# Patient Record
Sex: MALE | Race: WHITE | NOT HISPANIC OR LATINO | Employment: OTHER | URBAN - METROPOLITAN AREA
[De-identification: names, ages, dates, MRNs, and addresses within clinical notes are randomized per-mention and may not be internally consistent; named-entity substitution may affect disease eponyms.]

---

## 2017-11-08 ENCOUNTER — DOCUMENTATION ONLY (OUTPATIENT)
Dept: CARDIOLOGY | Facility: CLINIC | Age: 67
End: 2017-11-08

## 2017-11-08 NOTE — PROGRESS NOTES
Referred by Joel in [place] for evaluation of TIA and PFO.  BUbble study positive on Osirix only.  Will see us Monday for evaluation and possible PFO closure.

## 2017-11-09 ENCOUNTER — DOCUMENTATION ONLY (OUTPATIENT)
Dept: CARDIOLOGY | Facility: CLINIC | Age: 67
End: 2017-11-09

## 2017-11-09 RX ORDER — WARFARIN SODIUM 5 MG/1
5 TABLET ORAL DAILY
Status: ON HOLD | COMMUNITY
End: 2018-01-11 | Stop reason: HOSPADM

## 2017-11-09 RX ORDER — ASPIRIN 81 MG/1
81 TABLET ORAL DAILY
COMMUNITY

## 2017-11-13 ENCOUNTER — OFFICE VISIT (OUTPATIENT)
Dept: CARDIOLOGY | Facility: CLINIC | Age: 67
End: 2017-11-13
Payer: MEDICARE

## 2017-11-13 ENCOUNTER — CLINICAL SUPPORT (OUTPATIENT)
Dept: ELECTROPHYSIOLOGY | Facility: CLINIC | Age: 67
End: 2017-11-13
Attending: INTERNAL MEDICINE
Payer: MEDICARE

## 2017-11-13 VITALS
HEART RATE: 62 BPM | WEIGHT: 199.5 LBS | DIASTOLIC BLOOD PRESSURE: 78 MMHG | HEIGHT: 73 IN | SYSTOLIC BLOOD PRESSURE: 143 MMHG | BODY MASS INDEX: 26.44 KG/M2 | OXYGEN SATURATION: 100 %

## 2017-11-13 DIAGNOSIS — Q21.12 PFO (PATENT FORAMEN OVALE): ICD-10-CM

## 2017-11-13 DIAGNOSIS — I67.848 OTHER CEREBROVASCULAR VASOSPASM AND VASOCONSTRICTION: ICD-10-CM

## 2017-11-13 DIAGNOSIS — G45.9 TRANSIENT CEREBRAL ISCHEMIA, UNSPECIFIED TYPE: ICD-10-CM

## 2017-11-13 DIAGNOSIS — Q21.12 PFO (PATENT FORAMEN OVALE): Primary | ICD-10-CM

## 2017-11-13 PROCEDURE — 99214 OFFICE O/P EST MOD 30 MIN: CPT | Mod: PBBFAC | Performed by: INTERNAL MEDICINE

## 2017-11-13 PROCEDURE — 93270 REMOTE 30 DAY ECG REV/REPORT: CPT | Mod: PBBFAC | Performed by: INTERNAL MEDICINE

## 2017-11-13 PROCEDURE — 99205 OFFICE O/P NEW HI 60 MIN: CPT | Mod: S$PBB,,, | Performed by: INTERNAL MEDICINE

## 2017-11-13 PROCEDURE — 99999 PR PBB SHADOW E&M-EST. PATIENT-LVL IV: CPT | Mod: PBBFAC,,, | Performed by: INTERNAL MEDICINE

## 2017-11-13 NOTE — PROGRESS NOTES
"Subjective:    Patient ID:  Shaheen Pascual is a 67 y.o. male who presents for evaluation of PFO.  Referring physician: Dr Maldonado  Reason for referral: TIA and PFO    HPI   Patient is a 67 year old male with PMH sx for:   TIA   PFO  Who is referred by Dr Maldonado in [place] for PFO closure. As per patient he has had 4 episodes of TIA's with resolution of symptoms every time.  Symptoms include hemiparesis, now resolved.  He has hx of multiple long road trips.  Lower ext doppler US negative for DVT, carotids reviewed, had 5 day holter monitor with no evidence of a fib and is now here for PFO closure.  Denies palpitations or chest pain.    Review of Systems   Constitution: Negative for chills, fever, weakness and malaise/fatigue.   HENT: Negative for hearing loss.    Eyes: Negative for pain, vision loss in left eye, vision loss in right eye and visual disturbance.   Cardiovascular: Negative for chest pain, claudication, cyanosis, dyspnea on exertion, irregular heartbeat, leg swelling, near-syncope, orthopnea, palpitations, paroxysmal nocturnal dyspnea and syncope.   Respiratory: Negative for cough, hemoptysis, shortness of breath, sleep disturbances due to breathing and snoring.    Endocrine: Negative for cold intolerance and heat intolerance.   Hematologic/Lymphatic: Negative for adenopathy and bleeding problem.   Skin: Negative for poor wound healing.   Musculoskeletal: Negative for arthritis, back pain, falls, gout, joint pain, joint swelling and muscle cramps.   Gastrointestinal: Negative for bloating, abdominal pain and anorexia.   Neurological: Negative for brief paralysis, focal weakness, headaches, light-headedness, loss of balance, numbness and sensory change.   Psychiatric/Behavioral: Negative for altered mental status.        Objective:  BP (!) 143/78 (BP Location: Right arm, Patient Position: Sitting, BP Method: Large (Automatic))   Pulse 62   Ht 6' 1" (1.854 m)   Wt 90.5 kg (199 lb 8.3 oz)   SpO2 " 100%   BMI 26.32 kg/m²       Physical Exam   Constitutional: He is oriented to person, place, and time. He appears well-developed and well-nourished.   HENT:   Head: Normocephalic and atraumatic.   Eyes: Conjunctivae and EOM are normal. Pupils are equal, round, and reactive to light.   Neck: Normal range of motion. Neck supple. No JVD present.   Cardiovascular: Normal rate, regular rhythm, normal heart sounds and intact distal pulses.  Exam reveals no gallop and no friction rub.    No murmur heard.  Pulmonary/Chest: Effort normal. No respiratory distress. He has no wheezes. He has no rales.   Abdominal: Soft. Bowel sounds are normal. He exhibits no distension. There is no tenderness.   Musculoskeletal: Normal range of motion. He exhibits no edema or deformity.   Neurological: He is alert and oriented to person, place, and time.   Skin: Skin is warm and dry.   Psychiatric: He has a normal mood and affect. His behavior is normal.         Assessment:       1. PFO (patent foramen ovale) - pt with multiple TIA's, would like to get 30 day event monitor, if negative will plan on continuing with PFO closure.  Patient understands risks and benefits and gives consent.  Cont asp/coumadin.  If needs procedure will hold coumadin 5 days prior.   2. Transient cerebral ischemia, unspecified type         Plan:       30 day Event monitor autotrigger  Asp/coumadin    Joan Shin MD  Interventional Cardiology    Staff:  I have personally taken the history and examined this patient and agree with the fellow's note as stated above and amended it accordingly :-) Will do a 30 day event monitor and if no afib will stop warfarin, start plavix, and do PFO closure.  I want to be as certain as possible that afib isn't responsible for his TIA's at age 67.

## 2017-11-13 NOTE — LETTER
November 13, 2017      Ricki Maldonado MD  [address] [facility] [place] [address]           [facility]  [address]  [place] [address]  Phone: [phone]          Patient: Shaheen Pascual   MR Number: [id number]   YOB: 1950   Date of Visit: 11/13/2017       Dear Dr. Ricki Maldonado:    Thank you for referring Shaheen Pascual to me for evaluation. Attached you will find relevant portions of my assessment and plan of care.    If you have questions, please do not hesitate to call me. I look forward to following Shaheen Pascual along with you.    Sincerely,    Jorge Pate MD    Enclosure  CC:  No Recipients    If you would like to receive this communication electronically, please contact [email or web address] or [phone] to request more information on [employer] access.    For providers and/or their staff who would like to refer a patient to [facility], please contact us through our one-stop-shop provider referral line, [facility], at [phone].    If you feel you have received this communication in error or would no longer like to receive these types of communications, please e-mail [email or web address]

## 2017-12-20 PROCEDURE — 93272 ECG/REVIEW INTERPRET ONLY: CPT | Mod: ,,, | Performed by: INTERNAL MEDICINE

## 2018-01-02 ENCOUNTER — DOCUMENTATION ONLY (OUTPATIENT)
Dept: CARDIOLOGY | Facility: CLINIC | Age: 68
End: 2018-01-02

## 2018-01-02 DIAGNOSIS — Q21.12 PFO (PATENT FORAMEN OVALE): Primary | ICD-10-CM

## 2018-01-02 DIAGNOSIS — N18.9 CHRONIC KIDNEY DISEASE, UNSPECIFIED CKD STAGE: ICD-10-CM

## 2018-01-02 RX ORDER — DEXTROSE MONOHYDRATE AND SODIUM CHLORIDE 5; .45 G/100ML; G/100ML
INJECTION, SOLUTION INTRAVENOUS CONTINUOUS
Status: CANCELLED | OUTPATIENT
Start: 2018-01-02

## 2018-01-02 RX ORDER — CLOPIDOGREL BISULFATE 75 MG/1
75 TABLET ORAL DAILY
Qty: 30 TABLET | Refills: 11 | Status: SHIPPED | OUTPATIENT
Start: 2018-01-02 | End: 2022-11-28

## 2018-01-02 NOTE — PROGRESS NOTES
OUTPATIENT CATHETERIZATION INSTRUCTIONS    You have been scheduled for a procedure in the catheterization lab on Wednesday, January 10,  2018.     Please report to the Cardiology Waiting Area on the Third floor of the hospital and check in at 8 AM.   You will then be taken to the SSCU (Short Stay Cardiac Unit) and prepared for your procedure. Please be aware that this is not the time of your procedure but the time you are to arrive. The procedures are scheduled on an hourly basis; however, emergency cases take precedence over all other cases.       You may not have anything to eat or drink after midnight the night before your test. You may take your regular morning medications with water. If there are any medications that you should not take you will be instructed to hold them that morning. If you are diabetic and on Metformin (Glucophage) do not take it the day before, the day of, and for 2 days after your procedure.      The procedure will take 1-2 hours to perform. After the procedure, you will return to SSCU on the third floor of the hospital. You will need to lie still (or keep your arm still) for the next 4 to 6 hours to minimize bleeding from the puncture site. Your family may remain in the room with you during this time.       You may be able to be discharged home that same afternoon if there is someone to drive you home and there were no complications. If you have one of the balloon, stent, or device procedures you may spend the night in the hospital. Your doctor will determine, based on your progress, the date and time of your discharge. The results of your procedure will be discussed with you before you are discharged. Any further testing or procedures will be scheduled for you either before you leave or you will be called with these appointments.       If you should have any questions, concerns, or need to change the date of your procedure, please call [facility] @ [phone]    Special  Instructions:  Start taking Plavix 75 mg daily. Continue Aspirin 81 mg daily. Stop taking Coumadin.      THE ABOVE INSTRUCTIONS WERE GIVEN TO THE PATIENT VERBALLY AND THEY VERBALIZED UNDERSTANDING.  THEY DO NOT REQUIRE ANY SPECIAL NEEDS AND DO NOT HAVE ANY LEARNING BARRIERS.      Directions for Reporting to Cardiology Waiting Area in the Hospital  If you park in the Parking Garage:  Take elevators to the1st floor of the parking garage.  Continue past the gift shop, coffee shop, and piano.  Take a right and go to the gold elevators. (Elevator B)  Take the elevator to the 3rd floor.  Follow the arrow on the sign on the wall that says Cath Lab Registration/EP Lab Registration.  Follow the long hallway all the way around until you come to a big open area.  This is the registration area.  Check in at Reception Desk.    OR    If family is dropping you off:  Have them drop you off at the front of the Hospital under the green overhang.  Enter through the doors and take a right.  Take the E elevators to the 3rd floor Cardiology Waiting Area.  Check in at the Reception Desk in the waiting room.

## 2018-01-10 ENCOUNTER — HOSPITAL ENCOUNTER (OUTPATIENT)
Facility: HOSPITAL | Age: 68
Discharge: HOME | End: 2018-01-11
Attending: INTERNAL MEDICINE | Admitting: INTERNAL MEDICINE
Payer: MEDICARE

## 2018-01-10 DIAGNOSIS — N18.9 CHRONIC KIDNEY DISEASE, UNSPECIFIED CKD STAGE: ICD-10-CM

## 2018-01-10 DIAGNOSIS — Q21.12 PFO (PATENT FORAMEN OVALE): ICD-10-CM

## 2018-01-10 LAB
ABO + RH BLD: NORMAL
ANION GAP SERPL CALC-SCNC: 8 MMOL/L
APTT BLDCRRT: 22.7 SEC
BLD GP AB SCN CELLS X3 SERPL QL: NORMAL
BUN SERPL-MCNC: 17 MG/DL
CALCIUM SERPL-MCNC: 9.3 MG/DL
CHLORIDE SERPL-SCNC: 106 MMOL/L
CO2 SERPL-SCNC: 28 MMOL/L
CREAT SERPL-MCNC: 0.8 MG/DL
ERYTHROCYTE [DISTWIDTH] IN BLOOD BY AUTOMATED COUNT: 12.9 %
EST. GFR  (AFRICAN AMERICAN): >60 ML/MIN/1.73 M^2
EST. GFR  (NON AFRICAN AMERICAN): >60 ML/MIN/1.73 M^2
GLUCOSE SERPL-MCNC: 90 MG/DL
HCT VFR BLD AUTO: 43.8 %
HGB BLD-MCNC: 14.6 G/DL
INR PPP: 0.9
MCH RBC QN AUTO: 31.1 PG
MCHC RBC AUTO-ENTMCNC: 33.3 G/DL
MCV RBC AUTO: 93 FL
PLATELET # BLD AUTO: 203 K/UL
PMV BLD AUTO: 9.8 FL
POTASSIUM SERPL-SCNC: 3.6 MMOL/L
PROTHROMBIN TIME: 10.2 SEC
RBC # BLD AUTO: 4.7 M/UL
SODIUM SERPL-SCNC: 142 MMOL/L
WBC # BLD AUTO: 4.82 K/UL

## 2018-01-10 PROCEDURE — 63600175 PHARM REV CODE 636 W HCPCS

## 2018-01-10 PROCEDURE — 93010 ELECTROCARDIOGRAM REPORT: CPT | Mod: ,,, | Performed by: INTERNAL MEDICINE

## 2018-01-10 PROCEDURE — 63600175 PHARM REV CODE 636 W HCPCS: Performed by: STUDENT IN AN ORGANIZED HEALTH CARE EDUCATION/TRAINING PROGRAM

## 2018-01-10 PROCEDURE — 27200029 CATH LAB PROCEDURE

## 2018-01-10 PROCEDURE — 80048 BASIC METABOLIC PNL TOTAL CA: CPT

## 2018-01-10 PROCEDURE — S5010 5% DEXTROSE AND 0.45% SALINE: HCPCS | Performed by: INTERNAL MEDICINE

## 2018-01-10 PROCEDURE — 85027 COMPLETE CBC AUTOMATED: CPT

## 2018-01-10 PROCEDURE — 85610 PROTHROMBIN TIME: CPT

## 2018-01-10 PROCEDURE — 99152 MOD SED SAME PHYS/QHP 5/>YRS: CPT

## 2018-01-10 PROCEDURE — 86901 BLOOD TYPING SEROLOGIC RH(D): CPT

## 2018-01-10 PROCEDURE — 93580 TRANSCATH CLOSURE OF ASD: CPT | Mod: ,,, | Performed by: INTERNAL MEDICINE

## 2018-01-10 PROCEDURE — 25000003 PHARM REV CODE 250

## 2018-01-10 PROCEDURE — 85730 THROMBOPLASTIN TIME PARTIAL: CPT

## 2018-01-10 PROCEDURE — 25000003 PHARM REV CODE 250: Performed by: INTERNAL MEDICINE

## 2018-01-10 PROCEDURE — 93005 ELECTROCARDIOGRAM TRACING: CPT | Mod: 59

## 2018-01-10 PROCEDURE — 99152 MOD SED SAME PHYS/QHP 5/>YRS: CPT | Mod: ,,, | Performed by: INTERNAL MEDICINE

## 2018-01-10 RX ORDER — ACETAMINOPHEN 325 MG/1
650 TABLET ORAL EVERY 4 HOURS PRN
Status: DISCONTINUED | OUTPATIENT
Start: 2018-01-10 | End: 2018-01-11 | Stop reason: HOSPADM

## 2018-01-10 RX ORDER — ONDANSETRON 2 MG/ML
4 INJECTION INTRAMUSCULAR; INTRAVENOUS EVERY 12 HOURS PRN
Status: DISCONTINUED | OUTPATIENT
Start: 2018-01-10 | End: 2018-01-11 | Stop reason: HOSPADM

## 2018-01-10 RX ORDER — CLOPIDOGREL BISULFATE 75 MG/1
75 TABLET ORAL DAILY
Status: DISCONTINUED | OUTPATIENT
Start: 2018-01-11 | End: 2018-01-11 | Stop reason: HOSPADM

## 2018-01-10 RX ORDER — ASPIRIN 81 MG/1
81 TABLET ORAL DAILY
Status: DISCONTINUED | OUTPATIENT
Start: 2018-01-11 | End: 2018-01-11 | Stop reason: HOSPADM

## 2018-01-10 RX ORDER — DEXTROSE MONOHYDRATE AND SODIUM CHLORIDE 5; .45 G/100ML; G/100ML
INJECTION, SOLUTION INTRAVENOUS CONTINUOUS
Status: DISCONTINUED | OUTPATIENT
Start: 2018-01-10 | End: 2018-01-11 | Stop reason: HOSPADM

## 2018-01-10 RX ORDER — CEFAZOLIN SODIUM 1 G/50ML
1 SOLUTION INTRAVENOUS
Status: COMPLETED | OUTPATIENT
Start: 2018-01-10 | End: 2018-01-11

## 2018-01-10 RX ADMIN — CEFAZOLIN SODIUM 1 G: 1 SOLUTION INTRAVENOUS at 10:01

## 2018-01-10 RX ADMIN — DEXTROSE AND SODIUM CHLORIDE: 5; .45 INJECTION, SOLUTION INTRAVENOUS at 08:01

## 2018-01-10 NOTE — SUBJECTIVE & OBJECTIVE
Past Medical History:   Diagnosis Date    Anticoagulant long-term use     PFO (patent foramen ovale)     TIA (transient ischemic attack)        Past Surgical History:   Procedure Laterality Date    COLON SURGERY         Review of patient's allergies indicates:  No Known Allergies    PTA Medications   Medication Sig    aspirin (ECOTRIN) 81 MG EC tablet Take 81 mg by mouth once daily.    clopidogrel (PLAVIX) 75 mg tablet Take 1 tablet (75 mg total) by mouth once daily.    VIT C/E/ZN/COPPR/LUTEIN/ZEAXAN (PRESERVISION AREDS 2 ORAL) Take 2 tablets by mouth once daily.    TETRAHYDROZOLINE HCL/ZINC SULF (EYE DROPS OPHT) Apply to eye.    warfarin (COUMADIN) 5 MG tablet Take 5 mg by mouth Daily.     Family History     None        Social History Main Topics    Smoking status: Former Smoker     Start date: 11/13/1977    Smokeless tobacco: Never Used    Alcohol use No    Drug use: No    Sexual activity: Not on file     ROS  Objective:     Vital Signs (Most Recent):  Temp: 97 °F (36.1 °C) (01/10/18 0815)  Pulse: 69 (01/10/18 0815)  Resp: 18 (01/10/18 0815)  BP: 138/77 (01/10/18 0816)  SpO2: 99 % (01/10/18 0815) Vital Signs (24h Range):  Temp:  [97 °F (36.1 °C)] 97 °F (36.1 °C)  Pulse:  [69] 69  Resp:  [18] 18  SpO2:  [99 %] 99 %  BP: (137-138)/(77-81) 138/77     Weight: 95.3 kg (210 lb)  Body mass index is 27.71 kg/m².    SpO2: 99 %  O2 Device (Oxygen Therapy): room air    No intake or output data in the 24 hours ending 01/10/18 0838    Lines/Drains/Airways     Peripheral Intravenous Line                 Peripheral IV - Single Lumen 01/10/18 0828 Left Antecubital less than 1 day         Peripheral IV - Single Lumen 01/10/18 0829 Left Hand less than 1 day                Physical Exam   Constitutional: He is oriented to person, place, and time. He appears well-developed and well-nourished.   HENT:   Head: Normocephalic and atraumatic.   Eyes: EOM are normal. Pupils are equal, round, and reactive to light.   Neck:  Normal range of motion. No JVD present. No tracheal deviation present. No thyromegaly present.   Cardiovascular: Normal rate, regular rhythm, normal heart sounds and intact distal pulses.  Exam reveals no gallop and no friction rub.    No murmur heard.  Abdominal: Soft. Bowel sounds are normal. He exhibits no distension. There is no tenderness. There is no rebound and no guarding.   Musculoskeletal: Normal range of motion.   Neurological: He is oriented to person, place, and time.   Skin: Skin is warm and dry. No rash noted. No erythema.   Nursing note and vitals reviewed.      Significant Labs:   ABG: No results for input(s): PH, PCO2, HCO3, POCSATURATED, BE in the last 48 hours., Blood Culture: No results for input(s): LABBLOO in the last 48 hours., BMP: No results for input(s): GLU, NA, K, CL, CO2, BUN, CREATININE, CALCIUM, MG in the last 48 hours., CMP No results for input(s): NA, K, CL, CO2, GLU, BUN, CREATININE, CALCIUM, PROT, ALBUMIN, BILITOT, ALKPHOS, AST, ALT, ANIONGAP, ESTGFRAFRICA, EGFRNONAA in the last 48 hours., CBC   Recent Labs  Lab 01/10/18  0750   WBC 4.82   HGB 14.6   HCT 43.8      , INR No results for input(s): INR, PROTIME in the last 48 hours. and Lipid Panel No results for input(s): CHOL, HDL, LDLCALC, TRIG, CHOLHDL in the last 48 hours.    Significant Imaging: CT scan: CT ABDOMEN PELVIS WITH CONTRAST: No results found for this visit on 01/10/18. and CT ABDOMEN PELVIS WITHOUT CONTRAST: No results found for this visit on 01/10/18. and Echocardiogram: 2D echo with color flow doppler: No results found for this or any previous visit.

## 2018-01-10 NOTE — HPI
Patient is a 67 year old male with PMH sx for:              TIA              PFO  Who is referred by Dr Maldonado in [place] for PFO closure. As per patient he has had 4 episodes of TIA's with resolution of symptoms every time.  Symptoms include hemiparesis, now resolved.  He has hx of multiple long road trips.  Lower ext doppler US negative for DVT, carotids reviewed, had 5 day holter monitor with no evidence of a fib and is now here for PFO closure.  Denies palpitations or chest pain.

## 2018-01-10 NOTE — H&P
[facility]  Interventional Cardiology  H&P    Patient Name: Shaheen Pascual  MRN: [id number]  Admission Date: 1/10/2018  Code Status: No Order   Attending Provider: Jorge Pate MD   Primary Care Physician: Primary Doctor No  Principal Problem:<principal problem not specified>    Patient information was obtained from patient and ER records.     Subjective:     Chief Complaint:  ASD closure    HPI:  Patient is a 67 year old male with PMH sx for:              TIA              PFO  Who is referred by Dr Maldonado in [place] for PFO closure. As per patient he has had 4 episodes of TIA's with resolution of symptoms every time.  Symptoms include hemiparesis, now resolved.  He has hx of multiple long road trips.  Lower ext doppler US negative for DVT, carotids reviewed, had 5 day holter monitor with no evidence of a fib and is now here for PFO closure.  Denies palpitations or chest pain.       Past Medical History:   Diagnosis Date    Anticoagulant long-term use     PFO (patent foramen ovale)     TIA (transient ischemic attack)        Past Surgical History:   Procedure Laterality Date    COLON SURGERY         Review of patient's allergies indicates:  No Known Allergies    PTA Medications   Medication Sig    aspirin (ECOTRIN) 81 MG EC tablet Take 81 mg by mouth once daily.    clopidogrel (PLAVIX) 75 mg tablet Take 1 tablet (75 mg total) by mouth once daily.    VIT C/E/ZN/COPPR/LUTEIN/ZEAXAN (PRESERVISION AREDS 2 ORAL) Take 2 tablets by mouth once daily.    TETRAHYDROZOLINE HCL/ZINC SULF (EYE DROPS OPHT) Apply to eye.    warfarin (COUMADIN) 5 MG tablet Take 5 mg by mouth Daily.     Family History     None        Social History Main Topics    Smoking status: Former Smoker     Start date: 11/13/1977    Smokeless tobacco: Never Used    Alcohol use No    Drug use: No    Sexual activity: Not on file     ROS  Objective:     Vital Signs (Most Recent):  Temp: 97 °F (36.1 °C) (01/10/18  0815)  Pulse: 69 (01/10/18 0815)  Resp: 18 (01/10/18 0815)  BP: 138/77 (01/10/18 0816)  SpO2: 99 % (01/10/18 0815) Vital Signs (24h Range):  Temp:  [97 °F (36.1 °C)] 97 °F (36.1 °C)  Pulse:  [69] 69  Resp:  [18] 18  SpO2:  [99 %] 99 %  BP: (137-138)/(77-81) 138/77     Weight: 95.3 kg (210 lb)  Body mass index is 27.71 kg/m².    SpO2: 99 %  O2 Device (Oxygen Therapy): room air    No intake or output data in the 24 hours ending 01/10/18 0838    Lines/Drains/Airways     Peripheral Intravenous Line                 Peripheral IV - Single Lumen 01/10/18 0828 Left Antecubital less than 1 day         Peripheral IV - Single Lumen 01/10/18 0829 Left Hand less than 1 day                Physical Exam   Constitutional: He is oriented to person, place, and time. He appears well-developed and well-nourished.   HENT:   Head: Normocephalic and atraumatic.   Eyes: EOM are normal. Pupils are equal, round, and reactive to light.   Neck: Normal range of motion. No JVD present. No tracheal deviation present. No thyromegaly present.   Cardiovascular: Normal rate, regular rhythm, normal heart sounds and intact distal pulses.  Exam reveals no gallop and no friction rub.    No murmur heard.  Abdominal: Soft. Bowel sounds are normal. He exhibits no distension. There is no tenderness. There is no rebound and no guarding.   Musculoskeletal: Normal range of motion.   Neurological: He is oriented to person, place, and time.   Skin: Skin is warm and dry. No rash noted. No erythema.   Nursing note and vitals reviewed.      Significant Labs:   ABG: No results for input(s): PH, PCO2, HCO3, POCSATURATED, BE in the last 48 hours., Blood Culture: No results for input(s): LABBLOO in the last 48 hours., BMP: No results for input(s): GLU, NA, K, CL, CO2, BUN, CREATININE, CALCIUM, MG in the last 48 hours., CMP No results for input(s): NA, K, CL, CO2, GLU, BUN, CREATININE, CALCIUM, PROT, ALBUMIN, BILITOT, ALKPHOS, AST, ALT, ANIONGAP, ESTGFRAFRICA, EGFRNONAA  in the last 48 hours., CBC   Recent Labs  Lab 01/10/18  0750   WBC 4.82   HGB 14.6   HCT 43.8      , INR No results for input(s): INR, PROTIME in the last 48 hours. and Lipid Panel No results for input(s): CHOL, HDL, LDLCALC, TRIG, CHOLHDL in the last 48 hours.    Significant Imaging: CT scan: CT ABDOMEN PELVIS WITH CONTRAST: No results found for this visit on 01/10/18. and CT ABDOMEN PELVIS WITHOUT CONTRAST: No results found for this visit on 01/10/18. and Echocardiogram: 2D echo with color flow doppler: No results found for this or any previous visit.    Assessment and Plan:     PFO (patent foramen ovale)    -Will perform PFO closure using R CFV access  -Patient took this morning ASA and plavix  -The risks of moderate sedation include hypotension, respiratory depression, arrhythmias, bronchospasm, & death.    -Informed consent was obtained & the patient is agreeable to proceed with the procedure. All questions were answered.               VTE Risk Mitigation     None          Luis Carlos Gallardo MD  Interventional Cardiology   [facility]

## 2018-01-10 NOTE — ASSESSMENT & PLAN NOTE
-Will perform RHC via R CFV  -Obtain PV sats and pressures, inhaled nitric oxide test   -Potentially closure of the ASD  -Will do the case in conjunction with Dr Hemphill  -The risks, benefits & alternatives of the procedure were explained to the patient.    -The risks of RHC and ASD include but are not limited to:  Bleeding, infection, heart rhythm abnormalities, allergic reactions, kidney injury, stroke and death.    -The risks of moderate sedation include hypotension, respiratory depression, arrhythmias, bronchospasm, & death.    -Informed consent was obtained & the patient is agreeable to proceed with the procedure. All questions were answered.

## 2018-01-10 NOTE — PROGRESS NOTES
POST CATH NOTE    Procedure:  PFO closure  Referring MD:  Dr Maldonado  Indication:  TIA   Access:  R CFV    Findings:  PFO with Right to left shunt      Intervention:  Successful 25 Newton device septal occluder implantation and successful closure of the PFO    Patient tolerated the procedure well, no complications    Post Cath Exam:  Vitals:    01/10/18 0816   BP: 138/77   Pulse:    Resp:    Temp:      No unusual pain, hematoma or thrill at vascular access site.  Distal pulse present without signs of ischemia.    Post-procedure orders as per [facility]    Recommendation:  -continue dual antiplatelet therapy daily uninterrupted for at least 6 months, ASA lifelong  -Ancef 1 g q hours x 3 doses (still needs two more)  -Discharge tomorrow morning    Luis Carlos Gallardo MD  Cardiology Fellow, PGY VI  Pager: [phone]  1/10/2018 3:40 PM

## 2018-01-11 VITALS
OXYGEN SATURATION: 95 % | BODY MASS INDEX: 27.83 KG/M2 | SYSTOLIC BLOOD PRESSURE: 115 MMHG | RESPIRATION RATE: 20 BRPM | HEIGHT: 73 IN | HEART RATE: 68 BPM | DIASTOLIC BLOOD PRESSURE: 66 MMHG | WEIGHT: 210 LBS | TEMPERATURE: 98 F

## 2018-01-11 LAB
BASOPHILS # BLD AUTO: 0.02 K/UL
BASOPHILS NFR BLD: 0.3 %
DIFFERENTIAL METHOD: ABNORMAL
EOSINOPHIL # BLD AUTO: 0.2 K/UL
EOSINOPHIL NFR BLD: 2.4 %
ERYTHROCYTE [DISTWIDTH] IN BLOOD BY AUTOMATED COUNT: 13.1 %
HCT VFR BLD AUTO: 40.9 %
HGB BLD-MCNC: 13.5 G/DL
IMM GRANULOCYTES # BLD AUTO: 0.01 K/UL
IMM GRANULOCYTES NFR BLD AUTO: 0.2 %
LYMPHOCYTES # BLD AUTO: 1.9 K/UL
LYMPHOCYTES NFR BLD: 29.4 %
MCH RBC QN AUTO: 31 PG
MCHC RBC AUTO-ENTMCNC: 33 G/DL
MCV RBC AUTO: 94 FL
MONOCYTES # BLD AUTO: 0.7 K/UL
MONOCYTES NFR BLD: 11.7 %
NEUTROPHILS # BLD AUTO: 3.5 K/UL
NEUTROPHILS NFR BLD: 56 %
NRBC BLD-RTO: 0 /100 WBC
PLATELET # BLD AUTO: 163 K/UL
PMV BLD AUTO: 10.3 FL
RBC # BLD AUTO: 4.35 M/UL
WBC # BLD AUTO: 6.32 K/UL

## 2018-01-11 PROCEDURE — 85025 COMPLETE CBC W/AUTO DIFF WBC: CPT

## 2018-01-11 PROCEDURE — 63600175 PHARM REV CODE 636 W HCPCS: Performed by: STUDENT IN AN ORGANIZED HEALTH CARE EDUCATION/TRAINING PROGRAM

## 2018-01-11 PROCEDURE — 36415 COLL VENOUS BLD VENIPUNCTURE: CPT

## 2018-01-11 RX ADMIN — CEFAZOLIN SODIUM 1 G: 1 SOLUTION INTRAVENOUS at 07:01

## 2018-01-11 NOTE — SUBJECTIVE & OBJECTIVE
Interval History:   Patient did well overnight, no acute complaints. Reports no SOB, chest pain or palpitations.   Groin site access looks good and no signs of hematoma.     Objective:     Vital Signs (Most Recent):  Temp: 98.6 °F (37 °C) (01/11/18 0455)  Pulse: (!) 53 (01/11/18 0520)  Resp: 18 (01/11/18 0455)  BP: 108/65 (01/11/18 0455)  SpO2: 95 % (01/11/18 0455) Vital Signs (24h Range):  Temp:  [97 °F (36.1 °C)-98.6 °F (37 °C)] 98.6 °F (37 °C)  Pulse:  [52-84] 53  Resp:  [18] 18  SpO2:  [95 %-99 %] 95 %  BP: (108-147)/(65-81) 108/65     Weight: 95.3 kg (210 lb)  Body mass index is 27.71 kg/m².    SpO2: 95 %  O2 Device (Oxygen Therapy): room air      Intake/Output Summary (Last 24 hours) at 01/11/18 0713  Last data filed at 01/11/18 0500   Gross per 24 hour   Intake             1810 ml   Output              500 ml   Net             1310 ml       Lines/Drains/Airways     Peripheral Intravenous Line                 Peripheral IV - Single Lumen 01/10/18 0828 Left Antecubital less than 1 day         Peripheral IV - Single Lumen 01/10/18 0829 Left Hand less than 1 day                Physical Exam   Constitutional: He is oriented to person, place, and time. He appears well-developed and well-nourished.   HENT:   Head: Normocephalic and atraumatic.   Eyes: Pupils are equal, round, and reactive to light.   Neck: Normal range of motion. No JVD present. No tracheal deviation present. No thyromegaly present.   Cardiovascular: Normal rate, regular rhythm and normal heart sounds.  Exam reveals no gallop and no friction rub.    No murmur heard.  Pulmonary/Chest: Effort normal and breath sounds normal. No respiratory distress. He has no wheezes. He has no rales.   Abdominal: Soft. Bowel sounds are normal. He exhibits no distension. There is no tenderness. There is no rebound.   Musculoskeletal: Normal range of motion.   Neurological: He is alert and oriented to person, place, and time. He has normal reflexes.   Nursing note and  vitals reviewed.      Significant Labs:   ABG: No results for input(s): PH, PCO2, HCO3, POCSATURATED, BE in the last 48 hours., Blood Culture: No results for input(s): LABBLOO in the last 48 hours., BMP:   Recent Labs  Lab 01/10/18  0750   GLU 90      K 3.6      CO2 28   BUN 17   CREATININE 0.8   CALCIUM 9.3    and CBC   Recent Labs  Lab 01/10/18  0750   WBC 4.82   HGB 14.6   HCT 43.8          Significant Imaging: CT scan: CT ABDOMEN PELVIS WITH CONTRAST: No results found for this visit on 01/10/18. and CT ABDOMEN PELVIS WITHOUT CONTRAST: No results found for this visit on 01/10/18. and Echocardiogram: 2D echo with color flow doppler: No results found for this or any previous visit.

## 2018-01-11 NOTE — DISCHARGE SUMMARY
[facility]  Interventional Cardiology  Discharge Summary      Patient Name: Shaheen Pascual  MRN: [id number]  Admission Date: 1/10/2018  Hospital Length of Stay: 0 days  Discharge Date and Time:  01/11/2018 11:50 AM  Attending Physician: Jorge Pate MD  Discharging Provider: Luis Carlos Gallardo MD  Primary Care Physician: Primary Doctor No    HPI:     Patient is a 67 year old male with PMH sx for:              TIA              PFO  Who is referred by Dr Maldonado in [place] for PFO closure. As per patient he has had 4 episodes of TIA's with resolution of symptoms every time.  Symptoms include hemiparesis, now resolved.  He has hx of multiple long road trips.  Lower ext doppler US negative for DVT, carotids reviewed, had 5 day holter monitor with no evidence of a fib and is now here for PFO closure.  Denies palpitations or chest pain.    Procedure(s) (LRB):  Patent foramen ovale closure (N/A)     Indwelling Lines/Drains at time of discharge:  Lines/Drains/Airways          No matching active lines, drains, or airways          Hospital Course (synopsis of major diagnoses, care, treatment, and services provided during the course of the hospital stay):   Patient was admitted for PFO closure given history of cryptogenic TIA. He tolerated well the procedure and there were no complications.   We successfully closed the PFO with 26 mm  [place] device and bubble study with ICE showed no right to left shunt.   Patient remained clinically stable, received antibiotics x 3 doses, no groin issues and he was discharged home with ASA and plavix. He should take antibiotics prophylaxis for the next 6 months. He will follow up with Dr Pate in clinic in 6 months with 2D echo with bubble study.         Significant Diagnostic Studies: Labs:   BMP:   Recent Labs  Lab 01/10/18  0750   GLU 90      K 3.6      CO2 28   BUN 17   CREATININE 0.8   CALCIUM 9.3   , CMP   Recent Labs  Lab 01/10/18  0750      K  3.6      CO2 28   GLU 90   BUN 17   CREATININE 0.8   CALCIUM 9.3   ANIONGAP 8   ESTGFRAFRICA >60.0   EGFRNONAA >60.0    and CBC   Recent Labs  Lab 01/10/18  0750 01/11/18  0640   WBC 4.82 6.32   HGB 14.6 13.5*   HCT 43.8 40.9    163       Pending Diagnostic Studies:     Procedure Component Value Units Date/Time    2D echo with color flow doppler and bubble contrast [[id number]]     Order Status:  Sent Lab Status:  No result         Final Active Diagnoses:    Diagnosis Date Noted POA    PRINCIPAL PROBLEM:  PFO (patent foramen ovale) [Q21.1] 01/10/2018 Not Applicable      Problems Resolved During this Admission:    Diagnosis Date Noted Date Resolved POA       Discharged Condition: good    Follow Up:  Follow-up Information     Jorge Pate MD In 6 months.    Specialties:  Cardiology, INTERVENTIONAL CARDIOLOGY  Contact information:  [address] [place] [id number]  [phone]                 Patient Instructions:   No discharge procedures on file.  Medications:  Reconciled Home Medications:   Current Discharge Medication List      CONTINUE these medications which have NOT CHANGED    Details   aspirin (ECOTRIN) 81 MG EC tablet Take 81 mg by mouth once daily.      clopidogrel (PLAVIX) 75 mg tablet Take 1 tablet (75 mg total) by mouth once daily.  Qty: 30 tablet, Refills: 11      VIT C/E/ZN/COPPR/LUTEIN/ZEAXAN (PRESERVISION AREDS 2 ORAL) Take 2 tablets by mouth once daily.      TETRAHYDROZOLINE HCL/ZINC SULF (EYE DROPS OPHT) Apply to eye.         STOP taking these medications       warfarin (COUMADIN) 5 MG tablet Comments:   Reason for Stopping:               Time spent on the discharge of patient: 30 minutes    Luis Carlos Gallardo MD  Interventional Cardiology  [facility]

## 2018-01-11 NOTE — ASSESSMENT & PLAN NOTE
-Successful closure of PFO with 26 mm [place] occluder device  -No residual shunt on bubble study during the procedure  -Should remain on DAPT for 6 months, then ASA lifelong  -Should receive last ABx dose this morning and able to be discharged after  -Follow up with Dr Pate with 2D echo

## 2018-01-11 NOTE — PROGRESS NOTES
[facility]  Interventional Cardiology  Progress Note    Patient Name: Shaheen Pascual  MRN: [id number]  Admission Date: 1/10/2018  Hospital Length of Stay: 0 days  Code Status: No Order   Attending Physician: Jorge Pate MD   Primary Care Physician: Primary Doctor No  Principal Problem:<principal problem not specified>    Subjective:     Interval History:   Patient did well overnight, no acute complaints. Reports no SOB, chest pain or palpitations.   Groin site access looks good and no signs of hematoma.     Objective:     Vital Signs (Most Recent):  Temp: 98.6 °F (37 °C) (01/11/18 0455)  Pulse: (!) 53 (01/11/18 0520)  Resp: 18 (01/11/18 0455)  BP: 108/65 (01/11/18 0455)  SpO2: 95 % (01/11/18 0455) Vital Signs (24h Range):  Temp:  [97 °F (36.1 °C)-98.6 °F (37 °C)] 98.6 °F (37 °C)  Pulse:  [52-84] 53  Resp:  [18] 18  SpO2:  [95 %-99 %] 95 %  BP: (108-147)/(65-81) 108/65     Weight: 95.3 kg (210 lb)  Body mass index is 27.71 kg/m².    SpO2: 95 %  O2 Device (Oxygen Therapy): room air      Intake/Output Summary (Last 24 hours) at 01/11/18 0713  Last data filed at 01/11/18 0500   Gross per 24 hour   Intake             1810 ml   Output              500 ml   Net             1310 ml       Lines/Drains/Airways     Peripheral Intravenous Line                 Peripheral IV - Single Lumen 01/10/18 0828 Left Antecubital less than 1 day         Peripheral IV - Single Lumen 01/10/18 0829 Left Hand less than 1 day                Physical Exam   Constitutional: He is oriented to person, place, and time. He appears well-developed and well-nourished.   HENT:   Head: Normocephalic and atraumatic.   Eyes: Pupils are equal, round, and reactive to light.   Neck: Normal range of motion. No JVD present. No tracheal deviation present. No thyromegaly present.   Cardiovascular: Normal rate, regular rhythm and normal heart sounds.  Exam reveals no gallop and no friction rub.    No murmur heard.  Pulmonary/Chest: Effort  normal and breath sounds normal. No respiratory distress. He has no wheezes. He has no rales.   Abdominal: Soft. Bowel sounds are normal. He exhibits no distension. There is no tenderness. There is no rebound.   Musculoskeletal: Normal range of motion.   Neurological: He is alert and oriented to person, place, and time. He has normal reflexes.   Nursing note and vitals reviewed.      Significant Labs:   ABG: No results for input(s): PH, PCO2, HCO3, POCSATURATED, BE in the last 48 hours., Blood Culture: No results for input(s): LABBLOO in the last 48 hours., BMP:   Recent Labs  Lab 01/10/18  0750   GLU 90      K 3.6      CO2 28   BUN 17   CREATININE 0.8   CALCIUM 9.3    and CBC   Recent Labs  Lab 01/10/18  0750   WBC 4.82   HGB 14.6   HCT 43.8          Significant Imaging: CT scan: CT ABDOMEN PELVIS WITH CONTRAST: No results found for this visit on 01/10/18. and CT ABDOMEN PELVIS WITHOUT CONTRAST: No results found for this visit on 01/10/18. and Echocardiogram: 2D echo with color flow doppler: No results found for this or any previous visit.    Assessment and Plan:     Patient is a 67 y.o. male presenting with:    PFO (patent foramen ovale)    -Successful closure of PFO with 26 mm [place] occluder device  -No residual shunt on bubble study during the procedure  -Should remain on DAPT for 6 months, then ASA lifelong  -Should receive last ABx dose this morning and able to be discharged after  -Follow up with Dr Pate with 2D echo                VTE Risk Mitigation     None          Luis Carlos Gallardo MD  Interventional Cardiology  [facility]

## 2018-03-05 DIAGNOSIS — Q21.12 PFO (PATENT FORAMEN OVALE): Primary | ICD-10-CM

## 2018-07-09 ENCOUNTER — OFFICE VISIT (OUTPATIENT)
Dept: CARDIOLOGY | Facility: CLINIC | Age: 68
End: 2018-07-09
Payer: MEDICARE

## 2018-07-09 ENCOUNTER — HOSPITAL ENCOUNTER (OUTPATIENT)
Dept: CARDIOLOGY | Facility: CLINIC | Age: 68
Discharge: HOME | End: 2018-07-09
Attending: PHYSICIAN ASSISTANT
Payer: MEDICARE

## 2018-07-09 VITALS
SYSTOLIC BLOOD PRESSURE: 125 MMHG | DIASTOLIC BLOOD PRESSURE: 73 MMHG | HEIGHT: 73 IN | BODY MASS INDEX: 27.14 KG/M2 | OXYGEN SATURATION: 98 % | HEART RATE: 52 BPM | WEIGHT: 204.81 LBS

## 2018-07-09 DIAGNOSIS — G45.9 TRANSIENT CEREBRAL ISCHEMIA, UNSPECIFIED TYPE: ICD-10-CM

## 2018-07-09 DIAGNOSIS — Q21.12 PFO (PATENT FORAMEN OVALE): ICD-10-CM

## 2018-07-09 LAB
DIASTOLIC DYSFUNCTION: NO
ESTIMATED PA SYSTOLIC PRESSURE: 28.43
MITRAL VALVE MOBILITY: NORMAL
MITRAL VALVE REGURGITATION: NORMAL
RETIRED EF AND QEF - SEE NOTES: 60 (ref 55–65)

## 2018-07-09 PROCEDURE — 99213 OFFICE O/P EST LOW 20 MIN: CPT | Mod: PBBFAC,25 | Performed by: INTERNAL MEDICINE

## 2018-07-09 PROCEDURE — 99999 PR PBB SHADOW E&M-EST. PATIENT-LVL III: CPT | Mod: PBBFAC,,, | Performed by: INTERNAL MEDICINE

## 2018-07-09 PROCEDURE — 99213 OFFICE O/P EST LOW 20 MIN: CPT | Mod: S$PBB,,, | Performed by: INTERNAL MEDICINE

## 2018-07-09 PROCEDURE — 93306 TTE W/DOPPLER COMPLETE: CPT | Mod: PBBFAC | Performed by: INTERNAL MEDICINE

## 2018-07-09 RX ORDER — AMOXICILLIN 500 MG/1
500 TABLET, FILM COATED ORAL ONCE
Qty: 4 TABLET | Refills: 0 | Status: SHIPPED | OUTPATIENT
Start: 2018-07-09 | End: 2018-07-09

## 2018-07-09 NOTE — ASSESSMENT & PLAN NOTE
S/p PFO closure. Patient is 6 months post procedure so would stop plavix tomorrow but recommend to continue ASA 81 mg po daily indefinitely.   Echo reviewed today which showed well seated PFO device with no residual intracardiac shunt.   SBE PPx re-emphasized. Patient is planning for dental cleaning so prescription of amoxicillin 2g x 1 given.

## 2018-07-09 NOTE — PROGRESS NOTES
Subjective:    Patient ID:  Shaheen Pascual is a 67 y.o. male who presents for 6 months follow up after PFO closure with a 25mm Cardioform device on 1/10/2018.      Referring physician: Dr Maldonado    HPI   Patient is a 67 year old male with PMH sx for:   TIAs -  4 episodes of TIA's with resolution of symptoms every time. Had 30 day event monitoring negative for arrhythmia.   PFO     Denies palpitations or chest pain. Reports NYHA FC I. He has been on ASA and plavix since procedure. Reports functional status > 7 METS.      TTE 2018:  CONCLUSIONS     1 - Concentric remodeling.     2 - Normal left ventricular systolic function (EF 60-65%).     3 - Right ventricular enlargement with normal systolic function.     4 - Normal left ventricular diastolic function.     5 - Biatrial enlargement.     6 - Mildly enlarged ascending aorta.     7 - The estimated PA systolic pressure is 28 mmHg.     8 - Intermediate central venous pressure.     9 - Atrial septal closure device present with no evidence of intracardiac shunt.     Review of Systems   Constitution: Negative for chills, fever, weakness and malaise/fatigue.   HENT: Negative for hearing loss.    Eyes: Negative for pain, vision loss in left eye, vision loss in right eye and visual disturbance.   Cardiovascular: Negative for chest pain, claudication, cyanosis, dyspnea on exertion, irregular heartbeat, leg swelling, near-syncope, orthopnea, palpitations, paroxysmal nocturnal dyspnea and syncope.   Respiratory: Negative for cough, hemoptysis, shortness of breath, sleep disturbances due to breathing and snoring.    Endocrine: Negative for cold intolerance and heat intolerance.   Hematologic/Lymphatic: Negative for adenopathy and bleeding problem.   Skin: Negative for poor wound healing.   Musculoskeletal: Negative for arthritis, back pain, falls, gout, joint pain, joint swelling and muscle cramps.   Gastrointestinal: Negative for bloating, abdominal pain and anorexia.    Neurological: Negative for brief paralysis, focal weakness, headaches, light-headedness, loss of balance, numbness and sensory change.   Psychiatric/Behavioral: Negative for altered mental status.        Objective:      Physical Exam   Constitutional: He is oriented to person, place, and time. He appears well-developed and well-nourished.   HENT:   Head: Normocephalic and atraumatic.   Eyes: Conjunctivae and EOM are normal. Pupils are equal, round, and reactive to light.   Neck: Normal range of motion. Neck supple. No JVD present.   Cardiovascular: Normal rate, regular rhythm, normal heart sounds and intact distal pulses.  Exam reveals no gallop and no friction rub.    No murmur heard.  Pulmonary/Chest: Effort normal. No respiratory distress. He has no wheezes. He has no rales.   Abdominal: Soft. Bowel sounds are normal. He exhibits no distension. There is no tenderness.   Musculoskeletal: Normal range of motion. He exhibits no edema or deformity.   Neurological: He is alert and oriented to person, place, and time.   Skin: Skin is warm and dry.   Psychiatric: He has a normal mood and affect. His behavior is normal.         Assessment:       1. PFO (patent foramen ovale) - pt with multiple TIA's now s/p PFO closure with 25 mm cardioform device.   2. Transient cerebral ischemia, unspecified type      Plan:     PFO (patent foramen ovale)  S/p PFO closure. Patient is 6 months post procedure so would stop plavix tomorrow but recommend to continue ASA 81 mg po daily indefinitely.   Echo reviewed today which showed well seated PFO device with no residual intracardiac shunt.   SBE PPx re-emphasized. Patient is planning for dental cleaning so prescription of amoxicillin 2g x 1 given.       TIA (transient ischemic attack)  Continue with ASA 81 mg po daily.      Jarad Rebolledo MD  PGY-8  Interventional Cardiology Fellow     Staff:  I have personally taken the history and examined this patient and agree with the fellow's note  as stated above and amended it accordingly :-) He will followup with Joel in [place].

## 2018-07-09 NOTE — PROGRESS NOTES
Pt. Identified via spelling of name and date of birth. 20 gauge saline lock started in right ac under aseptic technique. Bubble study x 2 performed with and without valsalva maneuver.  Saline lock  D/yenifer and pressure dressing applied. Tolerated procedure well.

## 2020-06-10 ENCOUNTER — HISTORICAL (OUTPATIENT)
Dept: ADMINISTRATIVE | Facility: HOSPITAL | Age: 70
End: 2020-06-10

## 2020-06-10 LAB
ANION GAP SERPL CALC-SCNC: 3.4 MEQ/L (ref 10–20)
APPEARANCE, UA: CLEAR
BACTERIA SPEC CULT: NEGATIVE /HPF
BASOPHILS NFR BLD: 0 10 (ref 0–0.1)
BASOPHILS NFR BLD: 0.4 % (ref 0–1.5)
BILIRUB UR QL STRIP: NEGATIVE MG/DL
BUDDING YEAST: NORMAL /HPF
BUN SERPL-MCNC: 17 MG/DL (ref 7–18)
CALCIUM SERPL-MCNC: 8.4 MG/DL (ref 8.5–10.1)
CASTS, URINE MICROSCOPIC: NEGATIVE /LPF
CHLORIDE SERPL-SCNC: 111 MMOL/L (ref 98–107)
CO2 SERPL-SCNC: 31 MMOL/L (ref 22–32)
COLOR UR: YELLOW
CREAT SERPL-MCNC: 0.78 MG/DL (ref 0.7–1.3)
EGFR: 105 ML/MIN/1.73M
EOSINOPHIL NFR BLD: 0.2 10 (ref 0–0.7)
EOSINOPHIL NFR BLD: 4.3 % (ref 0–7)
EPITHELIAL, URINE MICROSCOPIC: NEGATIVE /HPF
ERYTHROCYTE [DISTWIDTH] IN BLOOD BY AUTOMATED COUNT: 13.2 % (ref 11.5–14.5)
GLUCOSE (UA): NEGATIVE MG/DL
GLUCOSE SERPL-MCNC: 87 MG/DL (ref 70–99)
GRAN #: 2.1 10 (ref 2–7.5)
GRAN%: 0 %
GRAN%: 45.2 % (ref 50–80)
HCT VFR BLD AUTO: 42.3 % (ref 43.5–53.7)
HGB BLD-MCNC: 14.1 G/DL (ref 14.1–18.1)
HGB UR QL STRIP: NEGATIVE ERY/UL
IMMATURE GRANULOCYTES #: 0 10
IPF: 2.4
KETONES UR QL STRIP: NEGATIVE MG/DL
LEUKOCYTE ESTERASE UR QL STRIP: NEGATIVE LEU/UL
LYMPH #: 1.9 10 (ref 1–3.5)
LYMPH%: 40 % (ref 12–50)
MCH RBC QN AUTO: 31.8 PG (ref 27–31)
MCHC RBC AUTO-ENTMCNC: 33.3 G% (ref 32–35)
MCV RBC AUTO: 95.3 FL (ref 80–97)
MONO #: 0.5 10 (ref 0–0.8)
MONO%: 10.1 % (ref 0–12)
NITRITE UR QL STRIP: NEGATIVE MG/DL
OSMOC: 282 MOSM/KG (ref 275–295)
PH UR STRIP: 6 [PH] (ref 5–7.5)
PMV BLD AUTO: 148 10 (ref 142–424)
PMV BLD AUTO: 9.9 FL (ref 7.4–10.4)
POTASSIUM SERPL-SCNC: 4.4 MMOL/L (ref 3.5–5.1)
PROT UR QL STRIP: NEGATIVE MG/DL
RBC # BLD AUTO: 4.44 M/UL (ref 4.69–6.13)
RBC #/AREA URNS HPF: NEGATIVE /HPF
SODIUM BLD-SCNC: 141 MMOL/L (ref 136–145)
SP GR UR STRIP: 1.02 (ref 1–1.03)
SPERM, URINE MICROSCOPIC: NORMAL /HPF
TYPE OF SPECIMEN  (UA): NORMAL
UNCLASSIFIED CRYSTALS, UA: NORMAL /HPF
UROBILINOGEN UR STRIP-ACNC: NORMAL EU/L
WBC # BLD AUTO: 4.7 10 (ref 4–10.2)
WBC #/AREA URNS HPF: NEGATIVE /HPF

## 2022-11-28 ENCOUNTER — LAB VISIT (OUTPATIENT)
Dept: LAB | Facility: HOSPITAL | Age: 72
End: 2022-11-28
Payer: MEDICARE

## 2022-11-28 DIAGNOSIS — R10.32 LEFT LOWER QUADRANT ABDOMINAL PAIN: ICD-10-CM

## 2022-11-28 LAB
ALBUMIN SERPL BCP-MCNC: 4.1 G/DL (ref 3.5–5.2)
ALP SERPL-CCNC: 72 U/L (ref 55–135)
ALT SERPL W/O P-5'-P-CCNC: 21 U/L (ref 10–44)
ANION GAP SERPL CALC-SCNC: 2 MMOL/L (ref 8–16)
AST SERPL-CCNC: 14 U/L (ref 10–40)
BASOPHILS # BLD AUTO: 0.02 K/UL (ref 0–0.2)
BASOPHILS NFR BLD: 0.5 % (ref 0–1.9)
BILIRUB SERPL-MCNC: 0.8 MG/DL (ref 0.1–1)
BUN SERPL-MCNC: 14 MG/DL (ref 8–23)
CALCIUM SERPL-MCNC: 9.4 MG/DL (ref 8.7–10.5)
CHLORIDE SERPL-SCNC: 109 MMOL/L (ref 95–110)
CO2 SERPL-SCNC: 31 MMOL/L (ref 23–29)
CREAT SERPL-MCNC: 0.9 MG/DL (ref 0.5–1.4)
DIFFERENTIAL METHOD: NORMAL
EOSINOPHIL # BLD AUTO: 0.2 K/UL (ref 0–0.5)
EOSINOPHIL NFR BLD: 4.1 % (ref 0–8)
ERYTHROCYTE [DISTWIDTH] IN BLOOD BY AUTOMATED COUNT: 13.5 % (ref 11.5–14.5)
EST. GFR  (NO RACE VARIABLE): >60 ML/MIN/1.73 M^2
GLUCOSE SERPL-MCNC: 75 MG/DL (ref 70–110)
HCT VFR BLD AUTO: 44.3 % (ref 40–54)
HGB BLD-MCNC: 14.4 G/DL (ref 14–18)
IMM GRANULOCYTES # BLD AUTO: 0 K/UL (ref 0–0.04)
IMM GRANULOCYTES NFR BLD AUTO: 0 % (ref 0–0.5)
LYMPHOCYTES # BLD AUTO: 1.9 K/UL (ref 1–4.8)
LYMPHOCYTES NFR BLD: 42.2 % (ref 18–48)
MCH RBC QN AUTO: 30.7 PG (ref 27–31)
MCHC RBC AUTO-ENTMCNC: 32.5 G/DL (ref 32–36)
MCV RBC AUTO: 95 FL (ref 82–98)
MONOCYTES # BLD AUTO: 0.4 K/UL (ref 0.3–1)
MONOCYTES NFR BLD: 8.4 % (ref 4–15)
NEUTROPHILS # BLD AUTO: 2 K/UL (ref 1.8–7.7)
NEUTROPHILS NFR BLD: 44.8 % (ref 38–73)
NRBC BLD-RTO: 0 /100 WBC
PLATELET # BLD AUTO: 172 K/UL (ref 150–450)
PMV BLD AUTO: 10.9 FL (ref 9.2–12.9)
POTASSIUM SERPL-SCNC: 4.4 MMOL/L (ref 3.5–5.1)
PROT SERPL-MCNC: 7.7 G/DL (ref 6–8.4)
RBC # BLD AUTO: 4.69 M/UL (ref 4.6–6.2)
SODIUM SERPL-SCNC: 142 MMOL/L (ref 136–145)
WBC # BLD AUTO: 4.41 K/UL (ref 3.9–12.7)

## 2022-11-28 PROCEDURE — 36415 COLL VENOUS BLD VENIPUNCTURE: CPT

## 2022-11-28 PROCEDURE — 80053 COMPREHEN METABOLIC PANEL: CPT

## 2022-11-28 PROCEDURE — 85025 COMPLETE CBC W/AUTO DIFF WBC: CPT

## 2022-11-30 ENCOUNTER — HOSPITAL ENCOUNTER (OUTPATIENT)
Dept: RADIOLOGY | Facility: HOSPITAL | Age: 72
Discharge: HOME | End: 2022-11-30
Payer: MEDICARE

## 2022-11-30 DIAGNOSIS — R10.32 LEFT LOWER QUADRANT ABDOMINAL PAIN: ICD-10-CM

## 2022-11-30 PROCEDURE — 74176 CT ABD & PELVIS W/O CONTRAST: CPT | Mod: TC

## 2024-02-29 PROBLEM — I63.9 ACUTE CEREBROVASCULAR ACCIDENT (CVA): Status: ACTIVE | Noted: 2023-08-30

## 2024-03-07 ENCOUNTER — LAB VISIT (OUTPATIENT)
Dept: LAB | Facility: HOSPITAL | Age: 74
End: 2024-03-07
Attending: INTERNAL MEDICINE
Payer: MEDICARE

## 2024-03-07 DIAGNOSIS — Z12.5 PROSTATE CANCER SCREENING: ICD-10-CM

## 2024-03-07 DIAGNOSIS — R09.89 SUSPECTED CEREBROVASCULAR ACCIDENT (CVA): ICD-10-CM

## 2024-03-07 DIAGNOSIS — I63.9 ACUTE CEREBROVASCULAR ACCIDENT (CVA): ICD-10-CM

## 2024-03-07 DIAGNOSIS — G45.9 TIA (TRANSIENT ISCHEMIC ATTACK): ICD-10-CM

## 2024-03-07 DIAGNOSIS — Z11.59 ENCOUNTER FOR HEPATITIS C SCREENING TEST FOR LOW RISK PATIENT: ICD-10-CM

## 2024-03-07 LAB
ALBUMIN SERPL BCP-MCNC: 3.6 G/DL (ref 3.5–5.2)
ALP SERPL-CCNC: 65 U/L (ref 55–135)
ALT SERPL W/O P-5'-P-CCNC: 18 U/L (ref 10–44)
ANION GAP SERPL CALC-SCNC: 3 MMOL/L (ref 3–11)
AST SERPL-CCNC: 12 U/L (ref 10–40)
BASOPHILS # BLD AUTO: 0.03 K/UL (ref 0–0.2)
BASOPHILS NFR BLD: 0.6 % (ref 0–1.9)
BILIRUB SERPL-MCNC: 0.9 MG/DL (ref 0.1–1)
BUN SERPL-MCNC: 17 MG/DL (ref 8–23)
CALCIUM SERPL-MCNC: 9 MG/DL (ref 8.7–10.5)
CHLORIDE SERPL-SCNC: 109 MMOL/L (ref 95–110)
CHOLEST SERPL-MCNC: 200 MG/DL (ref 120–199)
CHOLEST/HDLC SERPL: 4.2 {RATIO} (ref 2–5)
CO2 SERPL-SCNC: 28 MMOL/L (ref 23–29)
COMPLEXED PSA SERPL-MCNC: 0.77 NG/ML (ref 0–4)
CREAT SERPL-MCNC: 0.7 MG/DL (ref 0.5–1.4)
DIFFERENTIAL METHOD BLD: ABNORMAL
EOSINOPHIL # BLD AUTO: 0.2 K/UL (ref 0–0.5)
EOSINOPHIL NFR BLD: 3.5 % (ref 0–8)
ERYTHROCYTE [DISTWIDTH] IN BLOOD BY AUTOMATED COUNT: 13.5 % (ref 11.5–14.5)
EST. GFR  (NO RACE VARIABLE): >60 ML/MIN/1.73 M^2
GLUCOSE SERPL-MCNC: 81 MG/DL (ref 70–110)
HCT VFR BLD AUTO: 41.8 % (ref 40–54)
HCV AB SERPL QL IA: NORMAL
HDLC SERPL-MCNC: 48 MG/DL (ref 40–75)
HDLC SERPL: 24 % (ref 20–50)
HGB BLD-MCNC: 13.8 G/DL (ref 14–18)
IMM GRANULOCYTES # BLD AUTO: 0.01 K/UL (ref 0–0.04)
IMM GRANULOCYTES NFR BLD AUTO: 0.2 % (ref 0–0.5)
LDLC SERPL CALC-MCNC: 129 MG/DL (ref 63–159)
LYMPHOCYTES # BLD AUTO: 2 K/UL (ref 1–4.8)
LYMPHOCYTES NFR BLD: 41 % (ref 18–48)
MCH RBC QN AUTO: 31.6 PG (ref 27–31)
MCHC RBC AUTO-ENTMCNC: 33 G/DL (ref 32–36)
MCV RBC AUTO: 96 FL (ref 82–98)
MONOCYTES # BLD AUTO: 0.5 K/UL (ref 0.3–1)
MONOCYTES NFR BLD: 11.1 % (ref 4–15)
NEUTROPHILS # BLD AUTO: 2.1 K/UL (ref 1.8–7.7)
NEUTROPHILS NFR BLD: 43.6 % (ref 38–73)
NONHDLC SERPL-MCNC: 152 MG/DL
NRBC BLD-RTO: 0 /100 WBC
PLATELET # BLD AUTO: 165 K/UL (ref 150–450)
PMV BLD AUTO: 10.3 FL (ref 9.2–12.9)
POTASSIUM SERPL-SCNC: 4.1 MMOL/L (ref 3.5–5.1)
PROT SERPL-MCNC: 6.8 G/DL (ref 6–8.4)
RBC # BLD AUTO: 4.37 M/UL (ref 4.6–6.2)
SODIUM SERPL-SCNC: 140 MMOL/L (ref 136–145)
TRIGL SERPL-MCNC: 115 MG/DL (ref 30–150)
TSH SERPL DL<=0.005 MIU/L-ACNC: 1.92 UIU/ML (ref 0.4–4)
WBC # BLD AUTO: 4.88 K/UL (ref 3.9–12.7)

## 2024-03-07 PROCEDURE — 85025 COMPLETE CBC W/AUTO DIFF WBC: CPT | Performed by: INTERNAL MEDICINE

## 2024-03-07 PROCEDURE — 80061 LIPID PANEL: CPT | Performed by: INTERNAL MEDICINE

## 2024-03-07 PROCEDURE — 36415 COLL VENOUS BLD VENIPUNCTURE: CPT | Performed by: INTERNAL MEDICINE

## 2024-03-07 PROCEDURE — 84443 ASSAY THYROID STIM HORMONE: CPT | Performed by: INTERNAL MEDICINE

## 2024-03-07 PROCEDURE — 80053 COMPREHEN METABOLIC PANEL: CPT | Performed by: INTERNAL MEDICINE

## 2024-03-07 PROCEDURE — 84153 ASSAY OF PSA TOTAL: CPT | Performed by: INTERNAL MEDICINE

## 2024-03-07 PROCEDURE — 86803 HEPATITIS C AB TEST: CPT | Performed by: INTERNAL MEDICINE

## 2024-06-03 PROBLEM — I63.9 ACUTE CEREBROVASCULAR ACCIDENT (CVA): Status: RESOLVED | Noted: 2023-08-30 | Resolved: 2024-06-03

## 2024-10-21 ENCOUNTER — PATIENT MESSAGE (OUTPATIENT)
Dept: FAMILY MEDICINE | Facility: CLINIC | Age: 74
End: 2024-10-21
Payer: MEDICARE